# Patient Record
Sex: FEMALE | Race: WHITE | NOT HISPANIC OR LATINO | Employment: OTHER | URBAN - METROPOLITAN AREA
[De-identification: names, ages, dates, MRNs, and addresses within clinical notes are randomized per-mention and may not be internally consistent; named-entity substitution may affect disease eponyms.]

---

## 2020-01-24 ENCOUNTER — TELEPHONE (OUTPATIENT)
Dept: ONCOLOGY | Facility: CLINIC | Age: 85
End: 2020-01-24

## 2020-02-12 ENCOUNTER — ANCILLARY PROCEDURE (OUTPATIENT)
Dept: NUCLEAR MEDICINE | Facility: CLINIC | Age: 85
End: 2020-02-12
Attending: PSYCHIATRY & NEUROLOGY
Payer: COMMERCIAL

## 2020-02-12 DIAGNOSIS — R25.9 ABNORMAL INVOLUNTARY MOVEMENT: ICD-10-CM

## 2020-02-12 DIAGNOSIS — R25.1 INVOLUNTARY QUIVER: ICD-10-CM

## 2020-02-12 PROCEDURE — 78803 RP LOCLZJ TUM SPECT 1 AREA: CPT

## 2020-02-12 PROCEDURE — A9584 IODINE I-123 IOFLUPANE: HCPCS | Performed by: PSYCHIATRY & NEUROLOGY

## 2020-02-12 RX ORDER — IODINE AND POTASSIUM IODIDE 50; 100 MG/ML; MG/ML
LIQUID ORAL ONCE
Status: COMPLETED | OUTPATIENT
Start: 2020-02-12 | End: 2020-02-12

## 2020-02-12 RX ADMIN — IODINE AND POTASSIUM IODIDE 2 ML: 50; 100 LIQUID ORAL at 10:05

## 2022-10-13 ENCOUNTER — TRANSFERRED RECORDS (OUTPATIENT)
Dept: HEALTH INFORMATION MANAGEMENT | Facility: CLINIC | Age: 87
End: 2022-10-13

## 2022-10-17 ENCOUNTER — MEDICAL CORRESPONDENCE (OUTPATIENT)
Dept: HEALTH INFORMATION MANAGEMENT | Facility: CLINIC | Age: 87
End: 2022-10-17

## 2022-10-26 ENCOUNTER — TRANSCRIBE ORDERS (OUTPATIENT)
Dept: OTHER | Age: 87
End: 2022-10-26

## 2022-10-26 DIAGNOSIS — Z98.890 H/O BLEPHAROPLASTY: Primary | ICD-10-CM

## 2022-10-27 NOTE — TELEPHONE ENCOUNTER
FUTURE VISIT INFORMATION      FUTURE VISIT INFORMATION:    Date: 12/16/22    Time: 9:30am    Location: [facility]  REFERRAL INFORMATION:    Referring provider:  Dr. Uma Ornelas     Referring providers clinic:  [facility]    Reason for visit/diagnosis  H/O blepharoplasty     RECORDS REQUESTED FROM:       Clinic name Comments Records Status Imaging Status   [facility] recs scanned into chart under 10/13/22                                     LM asking when/where blepharoplasty was done

## 2022-11-04 ENCOUNTER — TELEPHONE (OUTPATIENT)
Dept: OPHTHALMOLOGY | Facility: CLINIC | Age: 87
End: 2022-11-04

## 2022-11-04 NOTE — TELEPHONE ENCOUNTER
Spoke with patient's daughter regarding scheduling for a sooner appointment from the wait-list. Patient declined scheduling as offered. -Per Patient's daughter

## 2022-11-04 NOTE — TELEPHONE ENCOUNTER
Spoke with patient's daughter regarding [employer] is out of Network. Provided [facility] in [place] for  to see patient as they are in [facility]. Patient's daughter was grateful and is aware of appointment being cancelled.-Per Patient's Daughter

## 2022-12-16 ENCOUNTER — PRE VISIT (OUTPATIENT)
Dept: OPHTHALMOLOGY | Facility: CLINIC | Age: 87
End: 2022-12-16